# Patient Record
Sex: FEMALE
[De-identification: names, ages, dates, MRNs, and addresses within clinical notes are randomized per-mention and may not be internally consistent; named-entity substitution may affect disease eponyms.]

---

## 2018-07-25 ENCOUNTER — HOSPITAL ENCOUNTER (EMERGENCY)
Dept: HOSPITAL 14 - H.ER | Age: 23
Discharge: HOME | End: 2018-07-25
Payer: COMMERCIAL

## 2018-07-25 VITALS — RESPIRATION RATE: 20 BRPM | HEART RATE: 88 BPM | SYSTOLIC BLOOD PRESSURE: 110 MMHG | DIASTOLIC BLOOD PRESSURE: 72 MMHG

## 2018-07-25 VITALS — TEMPERATURE: 98 F | OXYGEN SATURATION: 100 %

## 2018-07-25 VITALS — BODY MASS INDEX: 21.9 KG/M2

## 2018-07-25 DIAGNOSIS — J02.9: Primary | ICD-10-CM

## 2018-07-25 NOTE — ED PDOC
HPI: CCC, URI, Sore Throat


Time Seen by Provider: 07/25/18 09:44


Chief Complaint (Nursing): ENT Problem


Chief Complaint (Provider): ENT Problem


History Per: Patient


History/Exam Limitations: no limitations


Onset/Duration Of Symptoms: Days (5)


Associated Symptoms: Sore Throat (Right sided).  denies: Fever, Vomiting


Additional Complaint(s): 


23 years old female presents to the ED for evaluation of right sided throat 

pain and ear pain onset 5 days. Patient reports taking Motrin for pain. She 

denies any fever, vomiting or sick contact.





PMD: non provided





Past Medical History


Reviewed: Historical Data, Nursing Documentation, Vital Signs


Vital Signs: 


 Last Vital Signs











Temp  98 F   07/25/18 09:23


 


Pulse  61   07/25/18 09:23


 


Resp  16   07/25/18 09:23


 


BP  111/69   07/25/18 09:23


 


Pulse Ox  100   07/25/18 11:46














- Medical History


PMH: No Chronic Diseases





- Surgical History


Surgical History: No Surg Hx





- Family History


Family History: States: Unknown Family Hx





- Social History


Current smoker - smoking cessation education provided: No


Alcohol: None


Drugs: Denies





- Home Medications


Home Medications: 


 Ambulatory Orders











 Medication  Instructions  Recorded


 


Ibuprofen [Motrin Tab] 400 mg PO Q6 PRN 07/25/18


 


Ibuprofen [Motrin] 600 mg PO Q6H PRN #20 tab 07/25/18














- Allergies


Allergies/Adverse Reactions: 


 Allergies











Allergy/AdvReac Type Severity Reaction Status Date / Time


 


No Known Allergies Allergy   Verified 07/25/18 09:37














Review of Systems


ROS Statement: Except As Marked, All Systems Reviewed And Found Negative


Constitutional: Negative for: Fever


ENT: Positive for: Ear Pain (right sided), Throat Pain (Right sided)


Gastrointestinal: Negative for: Vomiting





Physical Exam





- Reviewed


Nursing Documentation Reviewed: Yes


Vital Signs Reviewed: Yes





- Physical Exam


Appears: Positive for: Non-toxic, No Acute Distress


Skin: Positive for: Normal Color, Warm, Dry


Eye Exam: Positive for: Normal appearance


ENT: Positive for: Normal ENT Inspection, Hearing Is (normal), Tonsillar 

Swelling (Right with redness)


Neurologic/Psych: Positive for: Alert, Oriented (x3)





- ECG


O2 Sat by Pulse Oximetry: 100 (RA)


Pulse Ox Interpretation: Normal





Medical Decision Making


Medical Decision Making: 


Time: 0948





Initial Impression: Shingles





Initial Plan:


--Motrin 600 mg PO


--Rapid Strep





1142


Rapid strep results show no significant abnormality. Patient is stable for 

discharge with a prescription of Motrin and instructed to follow up with the 

clinic. 


--------------------------------------------------------------------------------

-----


Scribe Attestation:


Documented by Cece Shelton, acting as a scribe for Deangelo Irizarry MD.





Provider Scribe Attestation:


All medical record entries made by the Scribe were at my direction and 

personally dictated by me. I have reviewed the chart and agree that the record 

accurately reflects my personal performance of the history, physical exam, 

medical decision making, and the department course for this patient. I have 

also personally directed, reviewed, and agree with the discharge instructions 

and disposition.





Disposition





- Clinical Impression


Clinical Impression: 


 Sore throat (viral)








- Disposition


Referrals: 


[facility] [Outside]


[facility] [Outside]


Disposition: Routine/Home


Disposition Time: 11:42


Condition: IMPROVED


Additional Instructions: 


follow up with the clinic in 2 days


take motrin for pain 


return to the ED with any worsening or concerning symptoms


Prescriptions: 


Ibuprofen [Motrin] 600 mg PO Q6H PRN #20 tab


 PRN Reason: Pain, Moderate (4-7)


Instructions:  Sore Throat, Adult (DC)


Forms:  [employer] (English)


Print Language: Turkish